# Patient Record
Sex: FEMALE | Race: WHITE | NOT HISPANIC OR LATINO | Employment: STUDENT
[De-identification: names, ages, dates, MRNs, and addresses within clinical notes are randomized per-mention and may not be internally consistent; named-entity substitution may affect disease eponyms.]

---

## 2017-05-03 ENCOUNTER — RECORDS - HEALTHEAST (OUTPATIENT)
Dept: ADMINISTRATIVE | Facility: OTHER | Age: 22
End: 2017-05-03

## 2017-11-21 ENCOUNTER — OFFICE VISIT - HEALTHEAST (OUTPATIENT)
Dept: FAMILY MEDICINE | Facility: CLINIC | Age: 22
End: 2017-11-21

## 2017-11-21 DIAGNOSIS — Z30.431 ENCOUNTER FOR ROUTINE CHECKING OF INTRAUTERINE CONTRACEPTIVE DEVICE (IUD): ICD-10-CM

## 2017-11-21 DIAGNOSIS — Z20.6 HIV EXPOSURE: ICD-10-CM

## 2017-11-21 DIAGNOSIS — Z12.4 SCREENING FOR MALIGNANT NEOPLASM OF CERVIX: ICD-10-CM

## 2017-11-21 DIAGNOSIS — Z11.3 SCREEN FOR STD (SEXUALLY TRANSMITTED DISEASE): ICD-10-CM

## 2017-11-21 LAB — HIV 1+2 AB+HIV1 P24 AG SERPL QL IA: NEGATIVE

## 2017-11-21 ASSESSMENT — MIFFLIN-ST. JEOR: SCORE: 1366.48

## 2017-11-22 LAB
HCV AB SERPL QL IA: NEGATIVE
SYPHILIS RPR SCREEN - HISTORICAL: NORMAL

## 2017-11-27 ENCOUNTER — COMMUNICATION - HEALTHEAST (OUTPATIENT)
Dept: FAMILY MEDICINE | Facility: CLINIC | Age: 22
End: 2017-11-27

## 2017-11-27 LAB
BKR LAB AP ABNORMAL BLEEDING: YES
BKR LAB AP BIRTH CONTROL/HORMONES: NORMAL
BKR LAB AP CERVICAL APPEARANCE: NORMAL
BKR LAB AP GYN ADEQUACY: NORMAL
BKR LAB AP GYN INTERPRETATION: NORMAL
BKR LAB AP HPV REFLEX: NORMAL
BKR LAB AP LMP: NORMAL
BKR LAB AP PATIENT STATUS: NORMAL
BKR LAB AP PREVIOUS ABNORMAL: NORMAL
BKR LAB AP PREVIOUS NORMAL: NORMAL
HIGH RISK?: NO
PATH REPORT.COMMENTS IMP SPEC: NORMAL
RESULT FLAG (HE HISTORICAL CONVERSION): NORMAL

## 2017-11-29 ENCOUNTER — RECORDS - HEALTHEAST (OUTPATIENT)
Dept: ADMINISTRATIVE | Facility: OTHER | Age: 22
End: 2017-11-29

## 2018-08-30 ENCOUNTER — COMMUNICATION - HEALTHEAST (OUTPATIENT)
Dept: FAMILY MEDICINE | Facility: CLINIC | Age: 23
End: 2018-08-30

## 2018-08-30 DIAGNOSIS — Z72.51 HIGH RISK SEXUAL BEHAVIOR: ICD-10-CM

## 2018-10-10 ENCOUNTER — OFFICE VISIT - HEALTHEAST (OUTPATIENT)
Dept: INFECTIOUS DISEASES | Facility: CLINIC | Age: 23
End: 2018-10-10

## 2018-10-10 DIAGNOSIS — Z20.6 HIV EXPOSURE: ICD-10-CM

## 2018-10-10 ASSESSMENT — MIFFLIN-ST. JEOR: SCORE: 1372.83

## 2018-12-03 ENCOUNTER — OFFICE VISIT - HEALTHEAST (OUTPATIENT)
Dept: FAMILY MEDICINE | Facility: CLINIC | Age: 23
End: 2018-12-03

## 2018-12-03 ENCOUNTER — COMMUNICATION - HEALTHEAST (OUTPATIENT)
Dept: SCHEDULING | Facility: CLINIC | Age: 23
End: 2018-12-03

## 2018-12-03 DIAGNOSIS — R10.2 PELVIC PAIN IN FEMALE: ICD-10-CM

## 2018-12-26 ENCOUNTER — COMMUNICATION - HEALTHEAST (OUTPATIENT)
Dept: ADMINISTRATIVE | Facility: CLINIC | Age: 23
End: 2018-12-26

## 2021-05-26 ENCOUNTER — RECORDS - HEALTHEAST (OUTPATIENT)
Dept: ADMINISTRATIVE | Facility: CLINIC | Age: 26
End: 2021-05-26

## 2021-05-31 VITALS — BODY MASS INDEX: 17.52 KG/M2 | WEIGHT: 122.4 LBS | HEIGHT: 70 IN

## 2021-06-02 VITALS — BODY MASS INDEX: 17.96 KG/M2 | WEIGHT: 124.3 LBS

## 2021-06-02 VITALS — WEIGHT: 123.8 LBS | HEIGHT: 70 IN | BODY MASS INDEX: 17.72 KG/M2

## 2021-06-14 NOTE — PROGRESS NOTES
Assessment/Plan:     1. Screen for STD (sexually transmitted disease)  - Chlamydia trachomatis & Neisseria gonorrhoeae, Amplified Detection  - HIV Antigen/Antibody Screening [place]  - Syphilis Screen, Cascade  - Hepatitis C Antibody (Anti-HCV)    2. Encounter for routine checking of intrauterine contraceptive device (IUD)  IUD visualized and in place.    3. Screening for malignant neoplasm of cervix  - Gynecologic Cytology (PAP Smear)    4. HIV exposure  Referral to infectious disease to discuss if HIV PREP would be appropriate.  HIV screening pending.  I highly encouraged condom use with EVERY sexual encounter; especially with HIV positive partner.   - Ambulatory referral to Infectious Disease        Subjective:     Yaz Bartlett is a 22 y.o. female who presents for STD screening and an IUD check.  Patient had her IUD placed in 2015 at the [facility] clinic.  Her periods are mostly absent; she does get some spotting at times and with sexual intercourse.  Denies any abnormal vaginal discharge.  History of normal paps.     Patient would also like to discuss if she would be a good candidate for HIV prep.  Her boyfriend is HIV positive, and they have had unprotected sex.  She does note that her boyfriend's levels are undetectable.  Patient denies any symptoms - no fever, fatigue, myalgias, abnormal weight loss, rash, or sore throat.     Patient has a history of drug and alcohol dependence.  She received inpatient treatment at [facility] in May of this year for treatment. Since then, she has been completely abstinent from drugs and alcohol.  She is living and working in [place], but is home frequently to visit family.       The following portions of the patient's history were reviewed and updated as appropriate: allergies, current medications, past family history, past medical history, past social history, past surgical history and problem list.    Review of Systems  Pertinent items are  "noted in HPI.     Objective:     /54 (Patient Site: Right Arm, Patient Position: Sitting, Cuff Size: Adult Regular)  Pulse 68  Ht 5' 9.75\" (1.772 m)  Wt 122 lb 6.4 oz (55.5 kg)  LMP 11/14/2017 (Approximate)  Breastfeeding? No  BMI 17.69 kg/m2    General Appearance: Alert, cooperative, no distress, appears stated age  Lungs: Clear to auscultation bilaterally, respirations unlabored  Heart: Regular rate and rhythm, S1 and S2 normal, no murmur, rub, or gallop   Abdomen: Soft, non-tender, bowel sounds active all four quadrants,  no masses, no organomegaly. No CVA tenderness.  Pelvic: Normally developed genitalia with no external lesions or eruptions. Vagina and cervix show no lesions, inflammation, discharge or tenderness. Slightly friable. IUD stings visualized and in place. No cystocele, No rectocele. No adnexal mass or tenderness.      Bertha Cotto, NP-C  "

## 2021-06-20 NOTE — PROGRESS NOTES
"[facility]   Outpatient Consult Note    ASSESSMENT  Discordant couple.  With our patient being HIV negative, her boyfriend HIV positive.  Since her partner is on HAART (Genvoya), with undetectable levels, and since they have very good level of communication, and since this is a monogamous relationship,  We do no feels there is a strong  indication for HIV preexposure prophylaxis for our patient, since the risk of transmission is extremely low.  Anytime any of these conditions is not satisfied, then patient becomes a candidate for HIV \"prep\".  Condom use is encouraged , although studies evaluating discordant couples who did not use condoms also suggest successful HAART prevents transmission.   Serial HIV testing is encouraged.    Michoacano Ballard MD  [facility]  On-Call ID provider: [phone], option: 9      HPI  23 years old female who was referred here because of her boyfriend being HIV positive with concerns about transmissibility.  They have known each other for at least  1 1/2 years.  They are in a monogamous relationship.  Patient has relocated from [place] to [place].  Her boyfriend has been HIV positive for at least 2 years and has been on Genvoya with undetectable levels.  There is a good level of communication between them and our patient actually goes to clinic with them.  Yaz latest HIV test was in November 2017.  Otherwise feels well with no health complaints.      Social history  No smoking.  No current alcoholism or illicit drug use.      Past surgical history  Intestinal malrotation correction at the age of 3 or 4  Bilateral hip surgery (?labral tear)    ROS  All systems reviewed, negative except for the above    No current outpatient prescriptions on file prior to visit.     No current facility-administered medications on file prior to visit.        Allergies   Allergen Reactions     Lactose Diarrhea       Social History     Social " "History     Marital status: Single     Spouse name: N/A     Number of children: N/A     Years of education: N/A     Occupational History     Not on file.     Social History Main Topics     Smoking status: Never Smoker     Smokeless tobacco: Never Used     Alcohol use No     Drug use: No     Sexual activity: Yes     Other Topics Concern     Not on file     Social History Narrative       Family History   Problem Relation Age of Onset     No Medical Problems Mother      No Medical Problems Father      No Medical Problems Sister      No Medical Problems Brother      Dementia Maternal Grandmother      No Medical Problems Paternal Grandmother      No Medical Problems Paternal Grandfather      No Medical Problems Sister      No Medical Problems Sister          PHYSICAL EXAM  /66  Temp 99.5  F (37.5  C) (Oral)   Ht 5' 9.75\" (1.772 m)  Wt 123 lb 12.8 oz (56.2 kg)  LMP 09/26/2018 (Approximate)  BMI 17.89 kg/m2    General Appearance:  Alert, cooperative, no distress, appears stated age    Head:  Normocephalic, without obvious abnormality, atraumatic   Neck no stiffness or adenopathy  Eyes no conjunctivitis or icterus   Oral cavity no thrush , ulcers or exudates    Lungs:  Clear to auscultation bilaterally    Chest Wall:  No tenderness or deformity    Heart:  Regular rate and rhythm, S1, S2 normal,no murmur, rub or gallop    Abdomen:  Soft, non-tender, bowel sounds active , no masses, no organomegaly    Extremities:  Extremities normal, atraumatic, no cyanosis or edema,     Skin:  Skin color, texture, turgor normal, no rashes or lesions    Neurologic:  Alert and oriented X 3, Moves all 4 extremities      DATA  Results for orders placed or performed in visit on 11/21/17   Chlamydia trachomatis & Neisseria gonorrhoeae, Amplified Detection   Result Value Ref Range    Chlamydia trachomatis, Amplified Detection Negative Negative    Neisseria gonorrhoeae, Amplified Detection Negative Negative   HIV Antigen/Antibody " Screening [place]   Result Value Ref Range    HIV Antigen / Antibody Negative Negative   Syphilis Screen, Cascade   Result Value Ref Range    Syphilis Screen [place] Non-Reactive Non-Reactive   Hepatitis C Antibody (Anti-HCV)   Result Value Ref Range    Hepatitis C Ab Negative Negative   Gynecologic Cytology (PAP Smear)   Result Value Ref Range    Case Report       Gynecologic Cytology Report                       Case: [id number]                                   Authorizing Provider:  Bertha Cotto NP        Collected:           11/21/2017 7004              Ordering Location:     [facility][note 3]  Received:            11/21/2017 6517                                     Family Medicine/OB                                                           First Screen:          ERIC Flaherty (ASCP)                                                     Rescreen:              ERIC Lopez                                                                            (ASCP)                                                                       Specimen:    SUREPATH PAP, SCREENING, Endocervical/cervical                                             Interpretation       Negative for squamous intraepithelial lesion or malignancy    Result Flag Normal Normal    Specimen Adequacy       Satisfactory for evaluation, endocervical/transformation zone component present    HPV Reflex? Yes if Abnormal     HIGH RISK No     LMP/Menopause Date Unknown     Abnormal Bleeding Yes     Pt Status N/A     Birth Control/Hormones IUD-Hormone     Previous Normal/Date N/A     Prev Abn Date/Dx N/A     Cervical Appearance slightly friable        RADIOLOGY  none

## 2021-06-22 NOTE — PROGRESS NOTES
Assessment/Plan:     1. Pelvic pain in female  Reassured by normal exam today.  Vitals are stable patient is afebrile.  Little concern for IUD malposition, as it appears to be well placed.  I cannot completely rule this out.  No CMT, indicating PID.  No urinary symptoms or fever indicating UTI.  Patient not currently sexually active, therefore little concern for ectopic pregnancy.  Possible ovulatory pain or ovarian cyst.  I think we can monitor her symptoms at this time.  I did place an order for a pelvic ultrasound to have completed if she has any continued symptoms or another episode.  She may utilize ibuprofen as needed for pain if needed.  Recommend emergent care with any fever, severely worsening abdominal pain, or abnormal bleeding.  - US Pelvis With Transvaginal Non OB; Future        Subjective:     Yaz Bartlett is a 23 y.o. female who presents with complaints of pelvic cramping.  Patient woke up earlier this morning with a severe episode of pelvic pain.  She reports the sensation as a severe menstrual cramp.  She was unable to move or get up during this episode.  Symptoms lasted about 45 minutes.  Symptoms have since resolved.  She does have some muscle pain now in the lower abdomen, as if she did some abdominal exercises.  This is happened on one other occasion about 2 months ago, but severity was much less.  Patient denies any fevers, nausea, vomiting, or diarrhea.  She has an IUD in place.  She is getting regular menstrual periods.  Last menstrual period was about 2 weeks ago.  She denies any abnormal bleeding or discharge.  She is not currently sexually active, but has been in the past.  Patient's IUD has been in for 4 years.  At last check, per myself it was in a good position.  Patient denies any diarrhea or constipation.  She is hydrating well.  She feels well in the clinic today, other than some abdominal soreness.      The following portions of the patient's history were reviewed and  updated as appropriate: allergies, current medications, past family history, past medical history, past social history, past surgical history and problem list.    Review of Systems  A comprehensive review of systems was performed and was otherwise negative    Objective:     /60 (Patient Site: Right Arm, Patient Position: Sitting, Cuff Size: Adult Regular)   Pulse 84   Temp 98.4  F (36.9  C) (Oral)   Wt 124 lb 4.8 oz (56.4 kg)   BMI 17.96 kg/m      General Appearance: Alert, cooperative, no distress, appears stated age  Back: no CVA tenderness  Lungs: Clear to auscultation bilaterally, respirations unlabored  Heart: Regular rate and rhythm, S1 and S2 normal, no murmur, rub, or gallop   Abdomen: Soft, non-tender, bowel sounds active all four quadrants,  no masses, no organomegaly  Pelvic:Perineum: is normal  Vulva: normal  Vagina: normal mucosa  Cervix: no cervical motion tenderness, nulliparous appearance and IUD strings visualized      CHANA JacobC

## 2022-01-23 ENCOUNTER — HEALTH MAINTENANCE LETTER (OUTPATIENT)
Age: 27
End: 2022-01-23

## 2022-01-24 ENCOUNTER — OFFICE VISIT (OUTPATIENT)
Dept: OBGYN | Facility: CLINIC | Age: 27
End: 2022-01-24
Payer: COMMERCIAL

## 2022-01-24 VITALS
HEART RATE: 79 BPM | WEIGHT: 129 LBS | OXYGEN SATURATION: 98 % | SYSTOLIC BLOOD PRESSURE: 106 MMHG | DIASTOLIC BLOOD PRESSURE: 70 MMHG | BODY MASS INDEX: 18.47 KG/M2 | HEIGHT: 70 IN

## 2022-01-24 DIAGNOSIS — Z30.431 IUD CHECK UP: ICD-10-CM

## 2022-01-24 DIAGNOSIS — R10.2 PELVIC PAIN IN FEMALE: ICD-10-CM

## 2022-01-24 DIAGNOSIS — Z12.4 SCREENING FOR CERVICAL CANCER: ICD-10-CM

## 2022-01-24 DIAGNOSIS — Z00.00 ANNUAL PHYSICAL EXAM: Primary | ICD-10-CM

## 2022-01-24 PROCEDURE — G0145 SCR C/V CYTO,THINLAYER,RESCR: HCPCS | Performed by: OBSTETRICS & GYNECOLOGY

## 2022-01-24 PROCEDURE — 99213 OFFICE O/P EST LOW 20 MIN: CPT | Mod: 25 | Performed by: OBSTETRICS & GYNECOLOGY

## 2022-01-24 PROCEDURE — 99385 PREV VISIT NEW AGE 18-39: CPT | Performed by: OBSTETRICS & GYNECOLOGY

## 2022-01-24 ASSESSMENT — MIFFLIN-ST. JEOR: SCORE: 1400.39

## 2022-01-24 NOTE — PROGRESS NOTES
Yaz is a 26 year old  female who presents for annual exam.     Menses are rare and variable lasting 0 days.  Menses flow: light.  No LMP recorded. (Menstrual status: Birth Control).. Using IUD for contraception.  She is not currently considering pregnancy.  Besides routine health maintenance, she has no other health concerns today .  Has Mirena placed 2015, replaced 2020.  LLQ pain and left sided pain with intercourse.  Started about 2-3 years ago.  Every time she has sex, but also happens otherwise.  Sharp stabbing pain with intercourse, goes away after 10-15min.  If not associated with sex, dull aching pain.  Saw someone for it in the past but no imaging was ever done.  Thinks that she notices it more since she's concerned about it.  Reviewed previous office notes regarding IUD as well as laboratory results on day of encounter.  GYNECOLOGIC HISTORY:  Menarche: 11  Age at first intercourse: 12 Number of lifetime partners: more than 6  Yaz is sexually active with 1male partner(s) and is currently in monogamous relationship with .    History sexually transmitted infections:No STD history  STI testing offered?  Declined  KATELIN exposure: No  History of abnormal Pap smear: NO - age 21-29 PAP every 3 years recommended  Family history of breast CA: No  Family history of uterine/ovarian CA: No    Family history of colon CA: No    HEALTH MAINTENANCE:  Cholesterol: (No results found for: CHOL History of abnormal lipids: No  Mammo:  . History of abnormal Mammo: Not applicable.  Regular Self Breast Exams: Yes  Calcium/Vitamin D intake: source:  dairy, dietary supplement(s) Adequate? Yes  TSH: (No results found for: TSH )  Pap; (No results found for: PAP )    HISTORY:  OB History    Para Term  AB Living   0 0 0 0 0 0   SAB IAB Ectopic Multiple Live Births   0 0 0 0 0     Past Medical History:   Diagnosis Date     ADHD (attention deficit hyperactivity disorder)      Insomnia       Past Surgical History:   Procedure Laterality Date     HIP ARTHROSCOPY W/ LABRAL REPAIR Left 2009    also the riight hip tear rpair, 2011     Family History   Problem Relation Age of Onset     No Known Problems Mother      No Known Problems Father      No Known Problems Sister      No Known Problems Brother      Dementia Maternal Grandmother      No Known Problems Paternal Grandmother      No Known Problems Paternal Grandfather      No Known Problems Sister      No Known Problems Sister      Social History     Socioeconomic History     Marital status:      Spouse name: None     Number of children: None     Years of education: None     Highest education level: None   Occupational History     None   Tobacco Use     Smoking status: Never Smoker     Smokeless tobacco: Never Used   Substance and Sexual Activity     Alcohol use: No     Drug use: No     Sexual activity: Yes     Partners: Male   Other Topics Concern     None   Social History Narrative     None     Social Determinants of Health     Financial Resource Strain: Not on file   Food Insecurity: Not on file   Transportation Needs: Not on file   Physical Activity: Not on file   Stress: Not on file   Social Connections: Not on file   Intimate Partner Violence: Not on file   Housing Stability: Not on file       Current Outpatient Medications:      Amphetamine-Dextroamphetamine (ADDERALL XR PO), Take 25 mg by mouth daily, Disp: , Rfl:      ClonazePAM (KLONOPIN PO), Take 2 mg by mouth daily, Disp: , Rfl:      levonorgestrel (MIRENA) 20 MCG/24HR IUD, 1 each by Intrauterine route once, Disp: , Rfl:      Allergies   Allergen Reactions     Lactose Diarrhea       Past medical, surgical, social and family history were reviewed and updated in EPIC.    ROS:   C:     NEGATIVE for fever, chills, change in weight  I:       NEGATIVE for worrisome rashes, moles or lesions  E:     NEGATIVE for vision changes or irritation  E/M: NEGATIVE for ear, mouth and throat  "problems  R:     NEGATIVE for significant cough or SOB  CV:   NEGATIVE for chest pain, palpitations or peripheral edema  GI:     NEGATIVE for nausea, abdominal pain, heartburn, or change in bowel habits  :   NEGATIVE for frequency, dysuria, hematuria, vaginal discharge, or irregular bleeding  M:     NEGATIVE for significant arthralgias or myalgia  N:      NEGATIVE for weakness, dizziness or paresthesias  E:      NEGATIVE for temperature intolerance, skin/hair changes  P:      NEGATIVE for changes in mood or affect.    EXAM:  /70   Pulse 79   Ht 1.778 m (5' 10\")   Wt 58.5 kg (129 lb)   SpO2 98%   BMI 18.51 kg/m     BMI: Body mass index is 18.51 kg/m .  Constitutional: healthy, alert and no distress  Head: Normocephalic. No masses, lesions, tenderness or abnormalities  Neck: Neck supple. Trachea midline. No adenopathy. Thyroid symmetric, normal size.   Cardiovascular: RRR.   Respiratory: Negative.   Breast: No nodularity, asymmetry or nipple discharge bilaterally.  Gastrointestinal: Abdomen soft, non-tender, non-distended. No masses, organomegaly.  :  Vulva:  No external lesions, normal female hair distribution, no inguinal adenopathy.    Urethra:  Midline, non-tender, well supported, no discharge  Vagina:  Moist, pink, no abnormal discharge, no lesions.  IUD strings appear appropriate in length  Uterus:  Normal size, non-tender, freely mobile  Ovaries:  No masses appreciated, non-tender, mobile  Rectal Exam: deferred  Musculoskeletal: extremities normal  Skin: no suspicious lesions or rashes  Psychiatric: Affect appropriate, cooperative,mentation appears normal.     COUNSELING:   Reviewed preventive health counseling, as reflected in patient instructions  Special attention given to:        Immunizations    Encouraged booster.  Patient reported having gotten flu shot in fall        reports that she has never smoked. She has never used smokeless tobacco.    Body mass index is 18.51 kg/m .    FRAX Risk " Assessment    ASSESSMENT:  26 year old female with satisfactory annual exam  (Z00.00) Annual physical exam  (primary encounter diagnosis)  Comment: Normal exam today.  Reviewed recommended HM      (Z12.4) Screening for cervical cancer  Plan: Pap imaged thin layer screen reflex to HPV if         ASCUS - recommend age 25 - 29            (R10.2) Pelvic pain in female  (Z30.431) IUD check up  Plan: US Transvaginal Pelvic Non-OB        IUD strings appropriate in length on exam and could not palpate IUD on bimanual.  Further evaluation with US.  Will get results via [employer].  Plan phone visit if any concerns on imaging.      RTC for pelvic US, annual exam and prn.    Tory Park MD

## 2022-01-26 LAB
BKR LAB AP GYN ADEQUACY: NORMAL
BKR LAB AP GYN INTERPRETATION: NORMAL
BKR LAB AP HPV REFLEX: NORMAL
BKR LAB AP PREVIOUS ABNORMAL: NORMAL
PATH REPORT.COMMENTS IMP SPEC: NORMAL
PATH REPORT.COMMENTS IMP SPEC: NORMAL
PATH REPORT.RELEVANT HX SPEC: NORMAL

## 2022-05-16 ENCOUNTER — E-VISIT (OUTPATIENT)
Dept: URGENT CARE | Facility: CLINIC | Age: 27
End: 2022-05-16
Payer: COMMERCIAL

## 2022-05-16 DIAGNOSIS — Z20.822 CLOSE EXPOSURE TO 2019 NOVEL CORONAVIRUS: Primary | ICD-10-CM

## 2022-05-16 PROCEDURE — 99421 OL DIG E/M SVC 5-10 MIN: CPT | Performed by: FAMILY MEDICINE

## 2022-05-16 NOTE — PATIENT INSTRUCTIONS
Dear Rosibel,    Based on your exposure to COVID-19 (coronavirus), we would like to test you for this virus. I have placed an order for this test. The best time for testing is 5-7 days after the exposure.    How to schedule:  Go to your [employer] home page and scroll down to the section that says  You have an appointment that needs to be scheduled  and click the large green button that says  Schedule Now  and follow the steps to find the next available opening.     If you are unable to complete these [employer] scheduling steps, please call [phone] to schedule your testing.     Return to work/school/ guidance:   Please let your workplace manager and staffing office know when your quarantine ends. We cannot give you an exact date as it depends on the information below. You can calculate this on your own or work with your manager/staffing office to calculate this.  These guidelines are for quarantining before returning to work, school or .     For employers, schools and day cares: This is an official notice for this person s medical guidelines for returning in-person.     For health care sites: The CDC gives different isolation and quarantine guidelines for healthcare sites, please check with these sites before arriving.     How and when do I quarantine?  You quarantine when you may have been exposed to the virus and DON T have symptoms.     Stay home and away from others.     You must quarantine for 5 days after your last contact with a person who has COVID.  o Note: If you are up to date with vaccines, you don t need to quarantine. You should still follow the steps below.     Wear a mask for 10 full days any time you re around others.    Get tested at least 5 days after you were exposed, even if you don t have symptoms.     If you start to have symptoms, isolate right away and get tested.    What are the symptoms of COVID-19?  Symptoms can include fever, cough, shortness of breath, chills, headache,  muscle pain, sore throat, fatigue, runny or stuffy nose, and loss of taste and smell. Other less common symptoms include nausea, vomiting, or diarrhea (watery stools).    If you develop symptoms, follow these guidelines:    If you're normally healthy: Please start another eVisit.    If you have a serious health problem (like cancer, heart failure, an organ transplant or kidney disease): Call your specialty clinic. Let them know that you might have COVID-19.    Where can I get more information?    [facility] - About COVID-19: [email or web address]     CDC - What to Do If You're Sick: https://www.cdc.gov/coronavirus/2019-ncov/if-you-are-sick/index.html     CDC - Quarantine & Isolation: https://www.cdc.gov/coronavirus/2019-ncov/your-health/quarantine-isolation.html     [facility] clinical trials (COVID-19 research studies): clinicalaffairs.[facility]-clinical-trials    Below are the COVID-19 hotlines at the [facility] of Health ([facility]). Interpreters are available.  o For health questions: Call [phone] or 1-692.792.5966 (7 a.m. to 7 p.m.)  o For questions about schools and childcare: Call [phone] or 1-489.667.2434 (7 a.m. to 7 p.m.)

## 2022-07-20 ENCOUNTER — MYC MEDICAL ADVICE (OUTPATIENT)
Dept: OBGYN | Facility: CLINIC | Age: 27
End: 2022-07-20

## 2022-07-20 DIAGNOSIS — G47.00 INSOMNIA, UNSPECIFIED TYPE: Primary | ICD-10-CM

## 2022-07-20 NOTE — TELEPHONE ENCOUNTER
Route to provider: patient requests RX for hydroxyzine. This medication was prescribed by a [facility] clinic for insomnia and not previously discussed with provider in our clinic.

## 2022-07-21 RX ORDER — HYDROXYZINE HYDROCHLORIDE 25 MG/1
25 TABLET, FILM COATED ORAL
Qty: 30 TABLET | Refills: 3 | Status: SHIPPED | OUTPATIENT
Start: 2022-07-21 | End: 2022-08-16

## 2022-08-04 ENCOUNTER — TELEPHONE (OUTPATIENT)
Dept: OBGYN | Facility: CLINIC | Age: 27
End: 2022-08-04

## 2022-08-04 NOTE — TELEPHONE ENCOUNTER
LVMTCB 8/4: There is a spot on Hold for RD OB on 9/13 at 10:15 with Dr. Park for an appointment for an IUD removal.Please see if the date and time is okay per appointment request patient asked for next opening available.      Thank you   Joselin MERIDA

## 2022-08-16 DIAGNOSIS — G47.00 INSOMNIA, UNSPECIFIED TYPE: ICD-10-CM

## 2022-08-16 RX ORDER — HYDROXYZINE HYDROCHLORIDE 25 MG/1
25 TABLET, FILM COATED ORAL
Qty: 30 TABLET | Refills: 3 | Status: SHIPPED | OUTPATIENT
Start: 2022-08-16 | End: 2022-11-15

## 2022-09-11 ENCOUNTER — HEALTH MAINTENANCE LETTER (OUTPATIENT)
Age: 27
End: 2022-09-11

## 2022-09-12 NOTE — PROGRESS NOTES
CC: IUD removal    HPI:   Yaz Bartlett is a 27 year old  who presents to clinic today for an IUD removal.  She desires removal of IUD because she just wants to see how she feels without contraception.  Has been on some sort of contraception since her early teens.  Has tried pill, ring, now IUD.   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  Would be open if it happened in Oct, as that timing would be better for some time off she has next year.  If not in Oct, plans to use condoms.    Last pap: 22      Reviewed GYN hx, OB hx, past medical hx, surgical hx and family hx.   Reviewed medications and allergies. Changes made in EPIC.     OBJECTIVE:   There were no vitals filed for this visit.  There is no height or weight on file to calculate BMI.     Gen: alert, oriented, no distress, cooperative and pleasant  Abd: soft, nontender, nondistended  : normal external genitalia without lesions or abnormalities. Normal Bartholin's, normal [place]'s, normal urethra. Normal vaginal mucosa, no abnormal discharge or lesions. Cervix appears smooth, WNL, no lesions or erythema.     PROCEDURE: IUD REMOVAL  Patient has verbalized understanding of risks and benefits. All questions answered. She has signed the consent form.      A regular Nuha speculum was placed in the vagina with good visualization of the cervix.  The IUD strings visualized at cervical os. IUD strings grasped with sterile ring forceps. Gentle traction applied and IUD removed intact without difficulty. There were no complications. The patient tolerated the procedure well.       ASSESSMENT:   Yaz Bartlett is a 27 year old  who had a Mirena IUD removed today without complication.    PLAN:  1. Encounter for IUD removal  Uncomplicated IUD removal.  May try for pregnancy next month, otherwise will use condoms.  Can reach out if she'd like to discuss other options at some  point  - REMOVE INTRAUTERINE DEVICE      Return to clinic annually, sooner PRN.    Tory Park MD

## 2022-09-13 ENCOUNTER — OFFICE VISIT (OUTPATIENT)
Dept: OBGYN | Facility: CLINIC | Age: 27
End: 2022-09-13
Payer: COMMERCIAL

## 2022-09-13 VITALS
HEART RATE: 91 BPM | OXYGEN SATURATION: 97 % | DIASTOLIC BLOOD PRESSURE: 75 MMHG | SYSTOLIC BLOOD PRESSURE: 111 MMHG | WEIGHT: 132 LBS | BODY MASS INDEX: 18.94 KG/M2

## 2022-09-13 DIAGNOSIS — Z30.432 ENCOUNTER FOR IUD REMOVAL: Primary | ICD-10-CM

## 2022-09-13 PROCEDURE — 58301 REMOVE INTRAUTERINE DEVICE: CPT | Performed by: OBSTETRICS & GYNECOLOGY

## 2022-11-14 DIAGNOSIS — G47.00 INSOMNIA, UNSPECIFIED TYPE: ICD-10-CM

## 2022-11-15 RX ORDER — HYDROXYZINE HYDROCHLORIDE 25 MG/1
25 TABLET, FILM COATED ORAL
Qty: 30 TABLET | Refills: 11 | Status: SHIPPED | OUTPATIENT
Start: 2022-11-15

## 2023-05-06 ENCOUNTER — HEALTH MAINTENANCE LETTER (OUTPATIENT)
Age: 28
End: 2023-05-06

## 2024-07-13 ENCOUNTER — HEALTH MAINTENANCE LETTER (OUTPATIENT)
Age: 29
End: 2024-07-13

## 2024-12-23 ENCOUNTER — TELEPHONE (OUTPATIENT)
Dept: OBGYN | Facility: CLINIC | Age: 29
End: 2024-12-23
Payer: COMMERCIAL

## 2025-07-19 ENCOUNTER — HEALTH MAINTENANCE LETTER (OUTPATIENT)
Age: 30
End: 2025-07-19